# Patient Record
Sex: FEMALE | Race: WHITE | NOT HISPANIC OR LATINO | Employment: FULL TIME | URBAN - METROPOLITAN AREA
[De-identification: names, ages, dates, MRNs, and addresses within clinical notes are randomized per-mention and may not be internally consistent; named-entity substitution may affect disease eponyms.]

---

## 2017-01-24 ENCOUNTER — HOSPITAL ENCOUNTER (EMERGENCY)
Facility: HOSPITAL | Age: 22
Discharge: HOME | End: 2017-01-24
Payer: MEDICAID

## 2017-01-24 VITALS
BODY MASS INDEX: 30.05 KG/M2 | TEMPERATURE: 98 F | OXYGEN SATURATION: 99 % | RESPIRATION RATE: 18 BRPM | HEART RATE: 65 BPM | HEIGHT: 66 IN | SYSTOLIC BLOOD PRESSURE: 129 MMHG | DIASTOLIC BLOOD PRESSURE: 78 MMHG | WEIGHT: 187 LBS

## 2017-01-24 DIAGNOSIS — Z72.51 UNPROTECTED SEX: ICD-10-CM

## 2017-01-24 DIAGNOSIS — N89.8 VAGINAL DISCHARGE: ICD-10-CM

## 2017-01-24 DIAGNOSIS — R10.2 PELVIC PAIN: ICD-10-CM

## 2017-01-24 DIAGNOSIS — Z72.51 HIGH RISK SEXUAL BEHAVIOR: ICD-10-CM

## 2017-01-24 DIAGNOSIS — N73.0 PID (ACUTE PELVIC INFLAMMATORY DISEASE): Primary | ICD-10-CM

## 2017-01-24 LAB
B-HCG UR QL: NEGATIVE
BACTERIA #/AREA URNS HPF: ABNORMAL /HPF
BACTERIA GENITAL QL WET PREP: ABNORMAL
BILIRUB UR QL STRIP: NEGATIVE
CLARITY UR: ABNORMAL
CLUE CELLS VAG QL WET PREP: ABNORMAL
COLOR UR: YELLOW
FILAMENT FUNGI VAG WET PREP-#/AREA: ABNORMAL
GLUCOSE UR QL STRIP: NEGATIVE
HGB UR QL STRIP: ABNORMAL
KETONES UR QL STRIP: ABNORMAL
LEUKOCYTE ESTERASE UR QL STRIP: NEGATIVE
MICROSCOPIC COMMENT: ABNORMAL
NITRITE UR QL STRIP: NEGATIVE
PH UR STRIP: 6 [PH] (ref 5–8)
PROT UR QL STRIP: ABNORMAL
RBC #/AREA URNS HPF: 10 /HPF (ref 0–4)
SP GR UR STRIP: >=1.03 (ref 1–1.03)
SPECIMEN SOURCE: ABNORMAL
T VAGINALIS GENITAL QL WET PREP: ABNORMAL
URN SPEC COLLECT METH UR: ABNORMAL
UROBILINOGEN UR STRIP-ACNC: 1 EU/DL
WBC #/AREA URNS HPF: 5 /HPF (ref 0–5)
WBC #/AREA VAG WET PREP: ABNORMAL
YEAST GENITAL QL WET PREP: ABNORMAL

## 2017-01-24 PROCEDURE — 87210 SMEAR WET MOUNT SALINE/INK: CPT

## 2017-01-24 PROCEDURE — 81000 URINALYSIS NONAUTO W/SCOPE: CPT

## 2017-01-24 PROCEDURE — 81025 URINE PREGNANCY TEST: CPT

## 2017-01-24 PROCEDURE — 87591 N.GONORRHOEAE DNA AMP PROB: CPT

## 2017-01-24 PROCEDURE — 99284 EMERGENCY DEPT VISIT MOD MDM: CPT

## 2017-01-24 RX ORDER — METRONIDAZOLE 500 MG/1
500 TABLET ORAL EVERY 12 HOURS
Qty: 28 TABLET | Refills: 0 | Status: SHIPPED | OUTPATIENT
Start: 2017-01-24 | End: 2017-02-07

## 2017-01-24 RX ORDER — DOXYCYCLINE HYCLATE 100 MG
100 TABLET ORAL
Status: DISCONTINUED | OUTPATIENT
Start: 2017-01-24 | End: 2017-01-25 | Stop reason: HOSPADM

## 2017-01-24 RX ORDER — DOXYCYCLINE 100 MG/1
100 CAPSULE ORAL 2 TIMES DAILY
Qty: 28 CAPSULE | Refills: 0 | Status: SHIPPED | OUTPATIENT
Start: 2017-01-24 | End: 2017-02-07

## 2017-01-24 RX ORDER — CEFTRIAXONE 250 MG/1
250 INJECTION, POWDER, FOR SOLUTION INTRAMUSCULAR; INTRAVENOUS
Status: DISCONTINUED | OUTPATIENT
Start: 2017-01-24 | End: 2017-01-25 | Stop reason: HOSPADM

## 2017-01-24 RX ORDER — METRONIDAZOLE 500 MG/1
500 TABLET ORAL
Status: DISCONTINUED | OUTPATIENT
Start: 2017-01-24 | End: 2017-01-25 | Stop reason: HOSPADM

## 2017-01-24 NOTE — ED AVS SNAPSHOT
[facility]  [id number] [facility] [address]               Rose Haynes   2017  9:29 PM   ED    Description:  Female : 1995   Department:  [facility]            Your Care was Coordinated By:     Provider Role From To    Lanre Jimneez NP Nurse Practitioner 17 [id number] --      Reason for Visit     Female  Problem           Diagnoses this Visit        Comments    PID (acute pelvic inflammatory disease)    -  Primary     Vaginal discharge         Pelvic pain         Unprotected sex         High risk sexual behavior           ED Disposition     None           To Do List           Follow-up Information     Schedule an appointment as soon as possible for a visit with pcp, gyn or ER .       These Medications        Disp Refills Start End    metronidazole (FLAGYL) 500 MG tablet 28 tablet 0 2017    Take 1 tablet (500 mg total) by mouth every 12 (twelve) hours. - Oral    doxycycline (VIBRAMYCIN) 100 MG Cap 28 capsule 0 2017    Take 1 capsule (100 mg total) by mouth 2 (two) times daily. - Oral      [facility]     [facility] -  Assistance  Registered nurses in the [facility] provide clinical advisement, health education, appointment booking, and other advisory services.  Call for this free service at [phone].             Medications           Message regarding Medications     Verify the changes and/or additions to your medication regime listed below are the same as discussed with your clinician today.  If any of these changes or additions are incorrect, please notify your healthcare provider.        START taking these NEW medications        Refills    metronidazole (FLAGYL) 500 MG tablet 0    Sig: Take 1 tablet (500 mg total) by mouth every 12 (twelve) hours.    Class: Print    Route: Oral    doxycycline (VIBRAMYCIN) 100 MG Cap 0    Sig: Take 1 capsule (100 mg  "total) by mouth 2 (two) times daily.    Class: Print    Route: Oral      These medications were administered today        Dose Freq    cefTRIAXone injection 250 mg 250 mg ED 1 Time    Sig: Inject 250 mg into the muscle ED 1 Time.    Class: Normal    Route: Intramuscular    metronidazole tablet 500 mg 500 mg ED 1 Time    Sig: Take 1 tablet (500 mg total) by mouth ED 1 Time.    Class: Normal    Route: Oral    doxycycline tablet 100 mg 100 mg ED 1 Time    Sig: Take 1 tablet (100 mg total) by mouth ED 1 Time.    Class: Normal    Route: Oral           Verify that the below list of medications is an accurate representation of the medications you are currently taking.  If none reported, the list may be blank. If incorrect, please contact your healthcare provider. Carry this list with you in case of emergency.           Current Medications     cefTRIAXone injection 250 mg Inject 250 mg into the muscle ED 1 Time.    doxycycline (VIBRAMYCIN) 100 MG Cap Take 1 capsule (100 mg total) by mouth 2 (two) times daily.    doxycycline tablet 100 mg Take 1 tablet (100 mg total) by mouth ED 1 Time.    metronidazole (FLAGYL) 500 MG tablet Take 1 tablet (500 mg total) by mouth every 12 (twelve) hours.    metronidazole tablet 500 mg Take 1 tablet (500 mg total) by mouth ED 1 Time.           Clinical Reference Information           Your Vitals Were     BP Pulse Temp Resp Height Weight    130/68 (Patient Position: Sitting) 81 97.9 °F (36.6 °C) (Oral) 18 5' 6" (1.676 m) 84.8 kg (187 lb)    SpO2 BMI             99% 30.18 kg/m2         Allergies as of 1/24/2017     No Known Allergies      Immunizations Administered on Date of Encounter - 1/24/2017     None      ED Micro, Lab, POCT     Start Ordered       Status Ordering Provider    01/24/17 2150 01/24/17 2149  Urinalysis Clean Catch  STAT      Final result     01/24/17 2150 01/24/17 2149  Rapid Pregnancy, Urine  STAT      Final result     01/24/17 2150 01/24/17 2149  C. trachomatis/N. " gonorrhoeae by AMP DNA Cervicovaginal  STAT      In process     17 [id number]  Vaginal Screen Vagina  STAT      Final result     17  Urinalysis Microscopic  Once      Final result       ED Imaging Orders     None        Discharge Instructions         Bacterial Vaginosis    You have a vaginal infection called bacterial vaginosis (BV). Both good and bad bacteria are present in a healthy vagina. BV occurs when these bacteria get out of balance. The number of bad bacteria increase. And the number of good bacteria decrease.  BV may or may not cause symptoms. If symptoms do occur, they can include:  · Thin, gray, milky-white, or sometimes green discharge  · Unpleasant odor or fishy smell  · Itching, burning, or pain in or around the vagina  It is not known what causes BV, but certain factors can make the problem more likely. This can include:  · Douching  · Having sex with a new partner  · Having sex with more than one partner  BV will sometimes go away on its own. But treatment is usually recommended. This is because untreated BV can increase the risk of more serious health problems such as:  · Pelvic inflammatory disease (PID)  ·  delivery (giving birth to a baby early if youre pregnant)  · HIV and certain other sexually transmitted diseases (STDs)  · Infection after surgery on the reproductive organs  Home care  General care  · BV is most often treated with medicines called antibiotics. These may be given as pills or as a vaginal cream. If antibiotics are prescribed, be sure to use them exactly as directed. Also, be sure to complete all of the medicine, even if your symptoms go away.  · Avoid douching or having sex during treatment.  · If you have sex with a female partner, ask your healthcare provider if she should also be treated.  Prevention  · Limit or avoid douching.  · Avoid having sex. If you do have sex, then take steps to lower your risk:  ¨ Use condoms when  having sex.  ¨ Limit the number of partners you have sex with.  Follow-up care  Follow up with your healthcare provider, or as advised.  When to seek medical advice  Call your healthcare provider right away if:  · You have a fever of 100.4ºF (38ºC) or higher, or as directed by your provider.  · Your symptoms worsen, or they dont go away within a few days of starting treatment.  · You have new pain in the lower belly or pelvic region.  · You have side effects that bother you or a reaction to the pills or cream youre prescribed.  · You or any partners you have sex with have new symptoms, such as a rash, joint pain, or sores.  © 4844-2726 [employer]. [address] [place] [address], [place] [address]. All rights reserved. This information is not intended as a substitute for professional medical care. Always follow your healthcare professional's instructions.          Pelvic Inflammatory Disease    Pelvic inflammatory disease (PID) is an infection of the female reproductive organs. These include the vagina, cervix, uterus, fallopian tubes, and ovaries.  PID is a common problem among women. It is most often caused by gonorrhea or chlamydia. These are bacterial infections that are spread through sex. For this reason, they are known as sexually transmitted diseases (STDs). PID can also be caused by other infections, though this is much less common.  When PID is found and treated early, it can often be cured. If not treated promptly, PID can lead to serious health problems. These include chronic pelvic pain and damage to the reproductive organs. PID can also lead to infertility. And it can increase the risk of tubal pregnancy. Mild cases of PID can often be treated at home. Severe cases of PID may need to be treated in the hospital.  Home care  · You will likely be prescribed a combination of antibiotics. Be sure to take the medicines exactly as directed.  · To help relieve pain, you may take over-the-counter pain  medicine. Pain medicine may also be prescribed, if needed.  · Inform any partners youve had sex with about your condition. They will need to be tested for infection and treated as well.  · Avoid having sex until you and any partners have finished treatment and tests show that you are no longer infected.  Prevention  · If you choose to have sex, make sure to practice safer sex. For instance, have sex with only one partner who only has sex with you. Ask your partner to be tested for STDs. Each time you have sex, use latex condoms. These help reduce the risk of STDs.  · Avoid douching if advised to by your healthcare provider. Douching may increase the risk of PID.  Follow-up care  Follow up with your healthcare provider, or as advised.  When to seek medical advice  Call your healthcare provider right away if any of these occur:  · Fever of 100.4°F (38°C) or higher, or as directed by your healthcare provider  · Symptoms do not improve after 3 days of treatment  · New or increasing pain in your lower belly or back  · Abnormal vaginal bleeding  · Abnormal vaginal discharge  · Weakness, dizziness or fainting  · Nausea or vomiting  · Trouble with urination or pain and burning during urination  · Rash or joint pain  · Painful open sores around the vagina  · Swollen or painful lymph nodes in the groin (these may be felt as lumps in the groin)  Resources  To learn more about PID and STDs, contact:  · The CDC National STD Hotline, 780.995.6822, www.cdc.gov/std/  © 5143-9901 [employer]. [address] [place] [address], [place] [address]. All rights reserved. This information is not intended as a substitute for professional medical care. Always follow your healthcare professional's instructions.          What Is Pelvic Inflammatory Disease (PID)?  PID is an infection of the reproductive organs. Left untreated, it can cause severe damage to the body. PID sometimes causes symptoms bad enough to send you to the emergency  room. But in many cases, PID is a silent infection with few or no symptoms. Rest assured that the infection can be treated. This can help prevent lasting damage.  Who gets PID?  Although PID can happen at any age, most women get it in their late teens or early twenties. Many dont know they have PID until years later. The longer a woman is infected, the higher her risk of further health problems. The more sexual partners you have the higher the risk. You are also more likely to get PID if you have had it before.   What are the symptoms?  If PID symptoms do occur, they are similar to those of many other health problems. This can make PID hard to detect. Symptoms can include:  · Pelvic pain  · Pain during sex, or bleeding afterward  · Painful or frequent urination  · Fever, chills, or other flu-like symptoms  · Vaginal discharge with a bad odor  · Abnormal vaginal bleeding   · Nausea and vomiting  · Pain in the upper right abdomen  How did I get PID?    PID occurs when certain bacteria infect the reproductive organs. Often, this happens because you are infected with an STD (sexually transmitted disease). In a few cases, women develop PID while using an IUD (intrauterine device) for birth control. This usually happens within the first 3 weeks after the IUD is inserted. PID is thought to occur by the following process:   1. Semen is sent from the penis into the vagina during sex. STD-causing bacteria may enter with the semen.  2. Bacteria may pass through the cervix and enter the uterus.  3. Bacteria travel from the uterus into the fallopian tubes and ovaries, which become infected.  4. The infection can leave the fallopian tubes and spread to other parts of the body.  Treatment can help  When PID is found and treated early, it can often be cured. But if not treated, PID can cause severe complications (health problems). These include damage to the reproductive organs, pelvic pain, and infertility (problems becoming  pregnant). Complications of PID can, in rare cases, even be life-threatening. This is why PID should be treated as early as possible.  © 4186-4341 [employer]. [address] [place] [address], [place] [address]. All rights reserved. This information is not intended as a substitute for professional medical care. Always follow your healthcare professional's instructions.          [facility] Sign-Up     Activating your [facility] account is as easy as 1-2-3!     1) Visit [email or web address], select Sign Up Now, enter this activation code and your date of birth, then select Next.  [email or web address]  Expires: 3/10/2017 11:22 PM      2) Create a username and password to use when you visit [facility] in the future and select a security question in case you lose your password and select Next.    3) Enter your e-mail address and click Sign Up!    Additional Information  If you have questions, please e-mail [email or web address] or call [phone] to talk to our [facility] staff. Remember, [facility] is NOT to be used for urgent needs. For medical emergencies, dial 911.          [facility] complies with applicable Federal civil rights laws and does not discriminate on the basis of race, color, national origin, age, disability, or sex.        Language Assistance Services     ATTENTION: Language assistance services are available, free of charge. Please call [phone].      ATENCIÓN: Si habla español, tiene a hudson disposición servicios gratuitos de asistencia lingüística. Llame al [phone].     CHÚ Ý: N?u b?n nói Ti?ng Vi?t, có các d?ch v? h? tr? ngôn ng? mi?n phí dành cho b?n. G?i s? [phone].

## 2017-01-25 NOTE — ED NOTES
Voiced wants to be checked out. States slept with 2 male,on black and one white. Has IUD with some bleeding noted

## 2017-01-25 NOTE — ED PROVIDER NOTES
"SCRIBE #1 NOTE: I, Arlette Jesse, am scribing for, and in the presence of, Lanre Jimenez NP. I have scribed the entire note.      History      Chief Complaint   Patient presents with    Female  Problem     odorous dischargex 1 week       Review of patient's allergies indicates:  No Known Allergies     HPI   HPI    1/24/2017, 9:11 PM   History obtained from the patient      History of Present Illness: Rose Haynes is a 21 y.o. female patient who presents to the Emergency Department for vaginal discharge which onset gradually 1 week ago. Symptoms are intermittent and moderate in severity. Pt describes the sxs as "odorous". No mitigating or exacerbating factors reported. Associated sxs include an itch. Patient denies any fever, n/v/d, vaginal bleeding, vaginal pain, dysuria, hematuria, difficulty urinating, and all other sxs at this time. No prior Tx reported. Pt reports having two sexual partners currently and states that she has unprotected sex with them both. Pt reports having an IUD placed in June of 2016. Pt also reports having a Rx for yeast infections two months ago. No further complaints or concerns at this time.         Arrival mode: Personal vehicle     PCP: Provider [facility]       Past Medical History:  Past medical history reviewed not relevant      Past Surgical History:  Past surgical history reviewed not relevant      Family History:  Family history reviewed not relevant      Social History:  Social History    Social History Main Topics    Social History Main Topics    Smoking status: Unknown if ever smoked    Smokeless tobacco: Unknown if ever used    Alcohol Use: Unknown drinking history    Drug Use: Unknown if ever used    Sexual Activity: Unknown         ROS   Review of Systems   Constitutional: Negative for fever.   HENT: Negative for sore throat.    Respiratory: Negative for shortness of breath.    Cardiovascular: Negative for chest pain.   Gastrointestinal: Negative for diarrhea, " nausea and vomiting.   Genitourinary: Positive for vaginal discharge (odorous). Negative for difficulty urinating, dysuria, frequency, hematuria, menstrual problem, vaginal bleeding and vaginal pain.   Musculoskeletal: Negative for back pain.   Skin: Negative for rash.   Neurological: Negative for weakness.   Hematological: Does not bruise/bleed easily.   All other systems reviewed and are negative.      Physical Exam    Initial Vitals   BP Pulse Resp Temp SpO2   01/24/17 2006 01/24/17 2006 01/24/17 2006 01/24/17 2006 01/24/17 2006   130/68 81 18 97.9 °F (36.6 °C) 99 %      Physical Exam  Nursing Notes and Vital Signs Reviewed.  Constitutional: Patient is in no acute distress. Awake and alert. Well-developed and well-nourished.  Head: Atraumatic. Normocephalic.  Eyes: PERRL. EOM intact. Conjunctivae are not pale. No scleral icterus.  ENT: Mucous membranes are moist. Oropharynx is clear and symmetric.    Neck: Supple. Full ROM. No lymphadenopathy.  Cardiovascular: Regular rate. Regular rhythm. No murmurs, rubs, or gallops. Distal pulses are 2+ and symmetric.  Pulmonary/Chest: No respiratory distress. Clear to auscultation bilaterally. No wheezing, rales, or rhonchi.  Abdominal: Soft and non-distended.  There is no tenderness.  No rebound, guarding, or rigidity.  Pelvic: A female chaperone was present for this examination. Nl external inspection. No lesions or abnormalities were visible on the labia majora or minora. Cervical os is closed. There is slight CMT. No adnexal tenderness. No adnexal masses. Moderate amount of blood. Slight odor. No foreign bodies. IUD strings in place.   Musculoskeletal: Moves all extremities. No obvious deformities. No edema. No calf tenderness.  Skin: Warm and dry.  Neurological:  Alert, awake, and appropriate.  Normal speech.  No acute focal neurological deficits are appreciated.  Psychiatric: Normal affect. Good eye contact. Appropriate in content.    ED Course    Procedures  ED Vital  "Signs:  Vitals:    01/24/17 2006 01/24/17 2341   BP: 130/68 129/78   Pulse: 81 65   Resp: 18 18   Temp: 97.9 °F (36.6 °C)    TempSrc: Oral    SpO2: 99% 99%   Weight: 84.8 kg (187 lb)    Height: 5' 6" (1.676 m)        Abnormal Lab Results:  Labs Reviewed   URINALYSIS - Abnormal; Notable for the following:        Result Value    Appearance, UA Hazy (*)     Specific Gravity, UA >=1.030 (*)     Protein, UA Trace (*)     Ketones, UA Trace (*)     Occult Blood UA 3+ (*)     All other components within normal limits   VAGINAL SCREEN - Abnormal; Notable for the following:     Clue Cells, Wet Prep Rare (*)     WBC - Vaginal Screen Occasional (*)     Bacteria - Vaginal Screen Moderate (*)     All other components within normal limits   URINALYSIS MICROSCOPIC - Abnormal; Notable for the following:     RBC, UA 10 (*)     All other components within normal limits   C. TRACHOMATIS/N. GONORRHOEAE BY AMP DNA   PREGNANCY TEST, URINE RAPID        All Lab Results:  Results for orders placed or performed during the hospital encounter of 01/24/17   Urinalysis Clean Catch   Result Value Ref Range    Specimen UA Urine, Clean Catch     Color, UA Yellow Yellow, Straw, Alison    Appearance, UA Hazy (A) Clear    pH, UA 6.0 5.0 - 8.0    Specific Gravity, UA >=1.030 (A) 1.005 - 1.030    Protein, UA Trace (A) Negative    Glucose, UA Negative Negative    Ketones, UA Trace (A) Negative    Bilirubin (UA) Negative Negative    Occult Blood UA 3+ (A) Negative    Nitrite, UA Negative Negative    Urobilinogen, UA 1.0 <2.0 EU/dL    Leukocytes, UA Negative Negative   Rapid Pregnancy, Urine   Result Value Ref Range    Preg Test, Ur Negative    Vaginal Screen Vagina   Result Value Ref Range    Trichomonas None None    Clue Cells, Wet Prep Rare (A) None    Budding Yeast None None    Fungal Hyphae None None    WBC - Vaginal Screen Occasional (A) None    Bacteria - Vaginal Screen Moderate (A) None    Wet Prep Source Vagina None   Urinalysis Microscopic   Result " Value Ref Range    RBC, UA 10 (H) 0 - 4 /hpf    WBC, UA 5 0 - 5 /hpf    Bacteria, UA Occasional None-Occ /hpf    Microscopic Comment SEE COMMENT               The Emergency Provider reviewed the vital signs and test results, which are outlined above.    ED Discussion     11:24 PM: Reassessed pt at this time.  Pt is awake, alert, and in NAD at this time. Discussed with pt all pertinent ED information and results. Discussed pt dx and plan of tx. Gave pt all f/u and return to the ED instructions. All questions and concerns were addressed at this time. Pt expresses understanding of information and instructions, and is comfortable with plan to discharge. Pt is stable for discharge.        ED Medication(s):  Medications - No data to display    Discharge Medication List as of 1/24/2017 11:22 PM      START taking these medications    Details   doxycycline (VIBRAMYCIN) 100 MG Cap Take 1 capsule (100 mg total) by mouth 2 (two) times daily., Starting 1/24/2017, Until Tue 2/7/17, Print      metronidazole (FLAGYL) 500 MG tablet Take 1 tablet (500 mg total) by mouth every 12 (twelve) hours., Starting 1/24/2017, Until Tue 2/7/17, Print             Follow-up Information     Schedule an appointment as soon as possible for a visit with pcp, gyn or ER .            Medical Decision Making    Medical Decision Making:   Clinical Tests:   Lab Tests: Ordered and Reviewed           Scribe Attestation:   Scribe #1: I performed the above scribed service and the documentation accurately describes the services I performed. I attest to the accuracy of the note.    Attending:   Physician Attestation Statement for Scribe #1: I, Lanre Jimenez NP, personally performed the services described in this documentation, as scribed by Arlette Molina, in my presence, and it is both accurate and complete.          Clinical Impression       ICD-10-CM ICD-9-CM   1. PID (acute pelvic inflammatory disease) N73.0 614.3   2. Vaginal discharge N89.8 623.5   3.  Pelvic pain R10.2    4. Unprotected sex Z72.51 V69.2   5. High risk sexual behavior Z72.51 V69.2       Disposition:   Disposition: Discharged  Condition: Stable         Lanre Jimenez NP  01/25/17 0247

## 2017-01-26 LAB
C TRACH DNA SPEC QL NAA+PROBE: NEGATIVE
N GONORRHOEA DNA SPEC QL NAA+PROBE: NEGATIVE

## 2017-05-23 ENCOUNTER — TELEPHONE (OUTPATIENT)
Dept: OBSTETRICS AND GYNECOLOGY | Facility: CLINIC | Age: 22
End: 2017-05-23

## 2017-05-23 ENCOUNTER — OFFICE VISIT (OUTPATIENT)
Dept: OBSTETRICS AND GYNECOLOGY | Facility: CLINIC | Age: 22
End: 2017-05-23
Payer: MEDICAID

## 2017-05-23 VITALS
DIASTOLIC BLOOD PRESSURE: 70 MMHG | BODY MASS INDEX: 28.88 KG/M2 | WEIGHT: 179.69 LBS | HEIGHT: 66 IN | SYSTOLIC BLOOD PRESSURE: 116 MMHG

## 2017-05-23 DIAGNOSIS — N30.00 ACUTE CYSTITIS WITHOUT HEMATURIA: Primary | ICD-10-CM

## 2017-05-23 DIAGNOSIS — N92.6 IRREGULAR MENSES: ICD-10-CM

## 2017-05-23 DIAGNOSIS — N89.8 VAGINAL DISCHARGE: ICD-10-CM

## 2017-05-23 PROCEDURE — 99999 PR PBB SHADOW E&M-EST. PATIENT-LVL II: CPT | Mod: PBBFAC,,, | Performed by: OBSTETRICS & GYNECOLOGY

## 2017-05-23 PROCEDURE — 99203 OFFICE O/P NEW LOW 30 MIN: CPT | Mod: S$PBB,,, | Performed by: OBSTETRICS & GYNECOLOGY

## 2017-05-23 PROCEDURE — 99212 OFFICE O/P EST SF 10 MIN: CPT | Mod: PBBFAC | Performed by: OBSTETRICS & GYNECOLOGY

## 2017-05-23 PROCEDURE — 87591 N.GONORRHOEAE DNA AMP PROB: CPT

## 2017-05-23 RX ORDER — NITROFURANTOIN 25; 75 MG/1; MG/1
100 CAPSULE ORAL 2 TIMES DAILY
Qty: 14 CAPSULE | Refills: 0 | Status: SHIPPED | OUTPATIENT
Start: 2017-05-23 | End: 2017-05-30

## 2017-05-23 RX ORDER — NITROFURANTOIN 25; 75 MG/1; MG/1
100 CAPSULE ORAL 2 TIMES DAILY
Qty: 14 CAPSULE | Refills: 0 | Status: SHIPPED | OUTPATIENT
Start: 2017-05-23 | End: 2017-05-23 | Stop reason: SDUPTHER

## 2017-05-23 NOTE — TELEPHONE ENCOUNTER
----- Message from Netta Hernandez sent at 5/23/2017  4:03 PM CDT -----  Patient was in today and a prescription was suppose to be sent to [facility] in [place] [phone] and they told her they did not get it.   She wants to know what pharmacy you sent it to?  Call her at [phone].                                              smith

## 2017-05-23 NOTE — PROGRESS NOTES
"Rose Haynes is a 21 y.o.  who presents for   Chief Complaint   Patient presents with    Contraception     c/o had not had a period x 6 months with diane, then starting bleeding 2 days ago. Pt states diane was placed in 2016.   - pt concerned it may be related to recent onset of oral sex.    Patient's last menstrual period was 2017 (exact date).     No dysmenorrhea. No fever, pain, or unusual d/c. Also c/o low abd tenderness for a couple weeks    Pt is sexually active - mut monog - still checks for string, but last check was over a month ago    No hx of abnormal paps. Last pap was normal in 2016, per pt.    Past Medical History:   Diagnosis Date    History of PID        Past Surgical History:   Procedure Laterality Date    HERNIA REPAIR         Current Outpatient Prescriptions   Medication Sig Dispense Refill    levonorgestrel (DIANE) 14 mcg/24 hour (3 years) IUD 1 each by Intrauterine route once.       No current facility-administered medications for this visit.           Review of patient's allergies indicates:   Allergen Reactions    Ibuprofen Nausea And Vomiting       .  Family History   Problem Relation Age of Onset    Breast cancer Neg Hx     Colon cancer Neg Hx     Ovarian cancer Neg Hx     Thrombosis Neg Hx        Social History   Substance Use Topics    Smoking status: Current Some Day Smoker     Types: Cigarettes    Smokeless tobacco: Not on file      Comment: occasionally    Alcohol use No       /70   Ht 5' 6" (1.676 m)   Wt 81.5 kg (179 lb 10.8 oz)   LMP 2017 (Exact Date)   BMI 29.00 kg/m²     ROS:  GENERAL: No other acute complaints.   GI: No nausea, vomiting, melena, hematochezia.  : No dysuria, hematuria.     GEN:  Alert and oriented lady in no distress.  HEAD and NECK: Normocephalic.   SKIN: No significant hirsutism - mild acne               ABDOMEN: Flat, soft and min tender in both lower quads s guarding or rebound. No mass, hepatomegaly, RUQT or CVAT. "   PELVIC:      Vulva: normal genitalia. No lesions.       Urethra: normal size and location. No lesion, prolapse, or discharge. Non tender.      Vagina: Moist, no unusual discharge, no cystocele or rectocele      Cervix: Normal appearance. Free of discharge or lesion. IUD string present. Gen Probe done.      Uterus: Normal size, shape, position, non tender. No cervical motion tenderness.           Bladder base is tender.      Adnexa: No masses, tenderness, or CDS nodularity.      Anus: normal appearing.    IMPRESSION: spotting w IUD, Possible cystitis, upt      PLAN: empirically tx w macrobid, stress importance of checking IUD string monthly, reassurance re occation bleeding on

## 2017-05-24 LAB
C TRACH DNA SPEC QL NAA+PROBE: NOT DETECTED
N GONORRHOEA DNA SPEC QL NAA+PROBE: NOT DETECTED

## 2017-06-07 ENCOUNTER — TELEPHONE (OUTPATIENT)
Dept: OBSTETRICS AND GYNECOLOGY | Facility: CLINIC | Age: 22
End: 2017-06-07

## 2017-06-07 NOTE — TELEPHONE ENCOUNTER
----- Message from Keya Devlin sent at 6/7/2017  3:43 PM CDT -----  Contact: self [phone]  States that she is calling for test results. Please call back at [phone]//thank you acc

## 2017-06-08 ENCOUNTER — TELEPHONE (OUTPATIENT)
Dept: OBSTETRICS AND GYNECOLOGY | Facility: CLINIC | Age: 22
End: 2017-06-08

## 2017-06-08 NOTE — TELEPHONE ENCOUNTER
----- Message from Jacquie Ferreira sent at 6/8/2017 10:27 AM CDT -----  Contact: [phone]  Pt returning nurse call. Please call back at [phone]. Thx. LJ

## 2017-06-09 ENCOUNTER — TELEPHONE (OUTPATIENT)
Dept: OBSTETRICS AND GYNECOLOGY | Facility: CLINIC | Age: 22
End: 2017-06-09

## 2017-06-09 NOTE — TELEPHONE ENCOUNTER
----- Message from Bree Wallace sent at 6/9/2017  9:43 AM CDT -----  Contact: Pt  Pt is returning the nurse call. Pls call pt back at [phone].